# Patient Record
Sex: FEMALE | Race: WHITE | NOT HISPANIC OR LATINO | URBAN - METROPOLITAN AREA
[De-identification: names, ages, dates, MRNs, and addresses within clinical notes are randomized per-mention and may not be internally consistent; named-entity substitution may affect disease eponyms.]

---

## 2024-04-19 ENCOUNTER — ON-DEMAND VIRTUAL (OUTPATIENT)
Dept: URGENT CARE | Facility: CLINIC | Age: 29
End: 2024-04-19

## 2024-04-19 DIAGNOSIS — H10.9 CONJUNCTIVITIS, UNSPECIFIED CONJUNCTIVITIS TYPE, UNSPECIFIED LATERALITY: Primary | ICD-10-CM

## 2024-04-19 PROCEDURE — 99203 OFFICE O/P NEW LOW 30 MIN: CPT | Mod: 95,,, | Performed by: NURSE PRACTITIONER

## 2024-04-19 RX ORDER — CIPROFLOXACIN HYDROCHLORIDE 3 MG/ML
1 SOLUTION/ DROPS OPHTHALMIC
Qty: 15 ML | Refills: 0 | Status: SHIPPED | OUTPATIENT
Start: 2024-04-19 | End: 2024-04-26

## 2024-04-19 RX ORDER — VENLAFAXINE 75 MG/1
75 TABLET ORAL DAILY
COMMUNITY

## 2024-04-19 RX ORDER — CETIRIZINE HYDROCHLORIDE 10 MG/1
10 TABLET ORAL DAILY
COMMUNITY

## 2024-04-19 RX ORDER — ALBUTEROL SULFATE 5 MG/ML
2.5 SOLUTION RESPIRATORY (INHALATION) EVERY 6 HOURS PRN
COMMUNITY

## 2024-04-19 NOTE — PROGRESS NOTES
Subjective:      Patient ID: Valentine Darden is a 29 y.o. female.    Vitals:  vitals were not taken for this visit.     Chief Complaint: Eye Problem      Visit Type: TELE AUDIOVISUAL    Present with the patient at the time of consultation: TELEMED PRESENT WITH PATIENT: None, at hotel    No past medical history on file.  No past surgical history on file.  Review of patient's allergies indicates:   Allergen Reactions    Clarithromycin Rash     Current Outpatient Medications on File Prior to Visit   Medication Sig Dispense Refill    albuterol (PROVENTIL) 5 mg/mL nebulizer solution Take 2.5 mg by nebulization every 6 (six) hours as needed for Wheezing. Rescue      cetirizine (ZYRTEC) 10 MG tablet Take 10 mg by mouth once daily.      copper intrauterine device (PARAGARD) 380 square mm IUD 1 each by Intrauterine route.      venlafaxine (EFFEXOR) 75 MG tablet Take 75 mg by mouth once daily.       No current facility-administered medications on file prior to visit.     No family history on file.    Medications Ordered                [id number] - [place], [place] - [address], [place] [address]    Telephone: [phone]   Fax: [phone]   Hours: Not open 24 hours                         E-Prescribed (1 of 1)              ciprofloxacin HCl (CILOXAN) 0.3 % ophthalmic solution    Sig: Place 1 drop into the left eye every 2 (two) hours. 1 drop every 2 hours while awake for 2 days, then every 4 hours for 5 days for 7 days       Start: 4/19/24     Quantity: 15 mL Refills: 0                           Oh[facility] Peq Odvv Intake    4/19/2024 11:34 AM CDT - Filed by Patient   What is your current physical address in the event of a medical emergency? [place] [address]   Are you able to take your vital signs? No   Please attach any relevant images or files          Right eye irritation. Recent URI symptoms, resolved. Now  "developed redness, drainage, eye swelling and "fullness" of the eye. No vision changes. + contact use.    Eye Problem   Associated symptoms include an eye discharge and eye redness. Pertinent negatives include no itching.       Eyes:  Positive for eye discharge, eye pain, eye redness and eyelid swelling. Negative for eye trauma, foreign body in eye, eye itching and vision loss.        Objective:   The physical exam was conducted virtually.  Physical Exam   Constitutional: She is oriented to person, place, and time. She does not appear ill. No distress.   HENT:   Head: Normocephalic and atraumatic.   Nose: Nose normal.   Eyes: Right eye exhibits discharge. Right conjunctiva is injected. Extraocular movement intact   Pulmonary/Chest: Effort normal.   Abdominal: Normal appearance.   Musculoskeletal: Normal range of motion.         General: Normal range of motion.   Neurological: no focal deficit. She is alert and oriented to person, place, and time.   Psychiatric: Her behavior is normal. Mood normal.   Vitals reviewed.      Assessment:     1. Conjunctivitis, unspecified conjunctivitis type, unspecified laterality        Plan:     Patient encouraged to monitor symptoms closely and instructed to follow-up for new or worsening symptoms. Further, in-person, evaluation may be necessary for continued treatment. Please follow up with your primary care doctor or specialist as needed. Verbally discussed plan. Patient confirms understanding and is in agreement with treatment and plan.     You must understand that you've received a Virtual Care evaluation only and that you may be released before all your medical problems are known or treated. You, the patient, will arrange for follow up care as instructed.    Conjunctivitis, unspecified conjunctivitis type, unspecified laterality  -     ciprofloxacin HCl (CILOXAN) 0.3 % ophthalmic solution; Place 1 drop into the left eye every 2 (two) hours. 1 drop every 2 hours while awake for 2 " days, then every 4 hours for 5 days for 7 days  Dispense: 15 mL; Refill: 0             Patient Instructions   Recommendations:     . Do not rub eyes. Wash hands frequently and disinfect common surfaces to avoid spread.     . Discontinue contact use until symptoms improve. Avoid eye make-up as this will likely contaminate your products.     . Warm or cool compresses may be soothing.     . Artificial tears to help lubricate and rinse the eye. Refrigerated drops may be soothing as well.     For allergic conjunctivitis: use antihistamines(Claritin, Zyrtec, Allegra), daily as directed, and allergy eye drops such as Pataday or Zaditor as directed.

## 2024-04-19 NOTE — PATIENT INSTRUCTIONS
Recommendations:     . Do not rub eyes. Wash hands frequently and disinfect common surfaces to avoid spread.     . Discontinue contact use until symptoms improve. Avoid eye make-up as this will likely contaminate your products.     . Warm or cool compresses may be soothing.     . Artificial tears to help lubricate and rinse the eye. Refrigerated drops may be soothing as well.     For allergic conjunctivitis: use antihistamines(Claritin, Zyrtec, Allegra), daily as directed, and allergy eye drops such as Pataday or Zaditor as directed.